# Patient Record
Sex: FEMALE | Race: AMERICAN INDIAN OR ALASKA NATIVE
[De-identification: names, ages, dates, MRNs, and addresses within clinical notes are randomized per-mention and may not be internally consistent; named-entity substitution may affect disease eponyms.]

---

## 2018-03-13 ENCOUNTER — HOSPITAL ENCOUNTER (EMERGENCY)
Dept: HOSPITAL 5 - ED | Age: 28
Discharge: HOME | End: 2018-03-13
Payer: COMMERCIAL

## 2018-03-13 VITALS — DIASTOLIC BLOOD PRESSURE: 58 MMHG | SYSTOLIC BLOOD PRESSURE: 114 MMHG

## 2018-03-13 DIAGNOSIS — O21.9: Primary | ICD-10-CM

## 2018-03-13 DIAGNOSIS — Z3A.01: ICD-10-CM

## 2018-03-13 DIAGNOSIS — F12.10: ICD-10-CM

## 2018-03-13 LAB
ALBUMIN SERPL-MCNC: 4.1 G/DL (ref 3.9–5)
ALT SERPL-CCNC: 13 UNITS/L (ref 7–56)
BASOPHILS # (AUTO): 0 K/MM3 (ref 0–0.1)
BASOPHILS NFR BLD AUTO: 0.4 % (ref 0–1.8)
BILIRUB UR QL STRIP: (no result)
BLOOD UR QL VISUAL: (no result)
BUN SERPL-MCNC: 12 MG/DL (ref 7–17)
BUN/CREAT SERPL: 20 %
CALCIUM SERPL-MCNC: 9.3 MG/DL (ref 8.4–10.2)
EOSINOPHIL # BLD AUTO: 0 K/MM3 (ref 0–0.4)
EOSINOPHIL NFR BLD AUTO: 0.3 % (ref 0–4.3)
HCT VFR BLD CALC: 41.6 % (ref 30.3–42.9)
HEMOLYSIS INDEX: 45
HGB BLD-MCNC: 13.2 GM/DL (ref 10.1–14.3)
LIPASE SERPL-CCNC: 17 UNITS/L (ref 13–60)
LYMPHOCYTES # BLD AUTO: 1.2 K/MM3 (ref 1.2–5.4)
LYMPHOCYTES NFR BLD AUTO: 15.4 % (ref 13.4–35)
MCH RBC QN AUTO: 28 PG (ref 28–32)
MCHC RBC AUTO-ENTMCNC: 32 % (ref 30–34)
MCV RBC AUTO: 89 FL (ref 79–97)
MONOCYTES # (AUTO): 0.3 K/MM3 (ref 0–0.8)
MONOCYTES % (AUTO): 4.4 % (ref 0–7.3)
MUCOUS THREADS #/AREA URNS HPF: (no result) /HPF
PH UR STRIP: 5 [PH] (ref 5–7)
PLATELET # BLD: 259 K/MM3 (ref 140–440)
PROT UR STRIP-MCNC: (no result) MG/DL
RBC # BLD AUTO: 4.67 M/MM3 (ref 3.65–5.03)
RBC #/AREA URNS HPF: 4 /HPF (ref 0–6)
UROBILINOGEN UR-MCNC: 2 MG/DL (ref ?–2)
WBC #/AREA URNS HPF: 3 /HPF (ref 0–6)

## 2018-03-13 PROCEDURE — 84702 CHORIONIC GONADOTROPIN TEST: CPT

## 2018-03-13 PROCEDURE — 76801 OB US < 14 WKS SINGLE FETUS: CPT

## 2018-03-13 PROCEDURE — 80053 COMPREHEN METABOLIC PANEL: CPT

## 2018-03-13 PROCEDURE — 83690 ASSAY OF LIPASE: CPT

## 2018-03-13 PROCEDURE — 36415 COLL VENOUS BLD VENIPUNCTURE: CPT

## 2018-03-13 PROCEDURE — 85025 COMPLETE CBC W/AUTO DIFF WBC: CPT

## 2018-03-13 PROCEDURE — 81001 URINALYSIS AUTO W/SCOPE: CPT

## 2018-03-13 PROCEDURE — 76817 TRANSVAGINAL US OBSTETRIC: CPT

## 2018-03-13 PROCEDURE — 99284 EMERGENCY DEPT VISIT MOD MDM: CPT

## 2018-03-13 PROCEDURE — 96360 HYDRATION IV INFUSION INIT: CPT

## 2018-03-13 NOTE — EMERGENCY DEPARTMENT REPORT
ED Pregnancy HPI





- General


Chief complaint: Abdominal Pain


Stated complaint: ABD PAIN


Time Seen by Provider: 18 12:29


Source: patient


Mode of arrival: Ambulatory


Limitations: No Limitations





- History of Present Illness


Initial comments: 





This is a 27-year-old female  nontoxic, well nourished in appearance, no 

acute signs of distress presents to the ED with c/o of nausea, vomiting, 

abdominal cramping times one week.  Patient describes abdominal cramping 

diffusely.  Patient denies any vaginal bleeding, vaginal discharge, dysuria, 

polyuria or hematuria.  She denies any back pain or flank pain, chest pain, 

short of breath, numbness, tingling, headache or stiff neck.  Patient denies 

any allergies or significant past medical history.  Last menstrual cycle 01/15/

2018.


MD Complaint: abdominal pain, other (n/v)


-: week(s) (1)


Location: abdomen


Radiation: none (diffuse)


Severity: mild


Severity scale (0 -10): 8


Quality: cramping


Consistency: constant


Improves with: none


Worsens with: none


Associated symptoms: nausea/vomiting, abdominal pain.  denies: vaginal bleeding

, vaginal discharge, dysuria, headache, vision changes, malaise, dysparuenia, 

rash, seizure, shortness of breath, syncope, weakness





- Related Data


 Previous Rx's











 Medication  Instructions  Recorded  Last Taken  Type


 


Metoclopramide [Reglan] 10 mg PO TID PRN #20 tab 18 Unknown Rx











 Allergies











Allergy/AdvReac Type Severity Reaction Status Date / Time


 


No Known Allergies Allergy   Unverified 18 12:18














ED Review of Systems


ROS: 


Stated complaint: ABD PAIN


Other details as noted in HPI





Constitutional: denies: chills, fever


Eyes: denies: eye pain, eye discharge, vision change


ENT: denies: ear pain, throat pain


Respiratory: denies: cough, shortness of breath, wheezing


Cardiovascular: denies: chest pain, palpitations


Endocrine: no symptoms reported


Gastrointestinal: abdominal pain, nausea, vomiting.  denies: diarrhea


Genitourinary: denies: urgency, dysuria, discharge


Musculoskeletal: denies: back pain, joint swelling, arthralgia


Skin: denies: rash, lesions


Neurological: denies: headache, weakness, paresthesias


Psychiatric: denies: anxiety, depression


Hematological/Lymphatic: denies: easy bleeding, easy bruising





ED Past Medical Hx





- Past Medical History


Previous Medical History?: No





- Surgical History


Past Surgical History?: No





- Social History


Smoking Status: Never Smoker


Substance Use Type: Alcohol, Marijuana





- Medications


Home Medications: 


 Home Medications











 Medication  Instructions  Recorded  Confirmed  Last Taken  Type


 


Metoclopramide [Reglan] 10 mg PO TID PRN #20 tab 18  Unknown Rx














ED Physical Exam





- General


Limitations: No Limitations


General appearance: alert, in no apparent distress





- Head


Head exam: Present: atraumatic, normocephalic





- Eye


Eye exam: Present: normal appearance, PERRL, EOMI


Pupils: Present: normal accommodation





- ENT


ENT exam: Present: normal exam, normal orophraynx, mucous membranes moist, TM's 

normal bilaterally, normal external ear exam





- Neck


Neck exam: Present: normal inspection, full ROM.  Absent: tenderness, 

meningismus, lymphadenopathy, thyromegaly





- Respiratory


Respiratory exam: Present: normal lung sounds bilaterally.  Absent: respiratory 

distress, wheezes, rales, rhonchi, stridor, chest wall tenderness, accessory 

muscle use, decreased breath sounds, prolonged expiratory





- Cardiovascular


Cardiovascular Exam: Present: regular rate, normal rhythm, normal heart sounds.

  Absent: bradycardia, tachycardia, irregular rhythm, systolic murmur, 

diastolic murmur, rubs, gallop





- GI/Abdominal


GI/Abdominal exam: Present: soft, normal bowel sounds.  Absent: distended, 

tenderness, guarding, rebound, rigid, diminished bowel sounds





- Rectal


Rectal exam: Present: deferred





- Extremities Exam


Extremities exam: Present: normal inspection, full ROM, normal capillary 

refill.  Absent: tenderness, pedal edema, joint swelling, calf tenderness





- Back Exam


Back exam: Present: normal inspection, full ROM.  Absent: tenderness, CVA 

tenderness (R), CVA tenderness (L), muscle spasm, paraspinal tenderness, 

vertebral tenderness, rash noted





- Neurological Exam


Neurological exam: Present: alert, oriented X3, CN II-XII intact, normal gait, 

reflexes normal





- Psychiatric


Psychiatric exam: Present: normal affect, normal mood





- Skin


Skin exam: Present: warm, dry, intact, normal color.  Absent: rash





ED Course





 Vital Signs











  18





  12:16


 


Temperature 97.9 F


 


Pulse Rate 89


 


Respiratory 16





Rate 


 


Blood Pressure 114/58


 


O2 Sat by Pulse 100





Oximetry 














- Reevaluation(s)


Reevaluation #1: 





18 18:19


Patient is speaking in full sentences with no signs of distress noted.





- Consultations


Consultation #1: 





18 18:20


Patient has been consulted with Dr. James about patient history, physical 

exam, and labs and examined and screened patient and agrees to ED plan of care 

and discharge plan of care. 





ED Medical Decision Making





- Lab Data


Result diagrams: 


 18 12:48





 18 12:48





- Medical Decision Making





This is a 27-year-old female that presents with nausea, vomiting, and 

pregnancy.  Patient is stable and was examined by me and Dr. James.  Labs 

within normal limits with slightly dehydration.  Ultrasound obtained and 

dictated by radiologist with small subchorinoic hemorrhage. Heart beat of 123.  

Patient was notified of the ultrasound reported by the patient.  patient 

received 1 l normal saline and zofran.  a by mouth challenge has been obtained 

and patient tolerated well with no nausea vomiting.  Patient was instructed and 

referred to Follow-up with a primary care/obgyn doctor in 3-5 days or if 

symptoms worsen and continue return to emergency room as soon as possible.  At 

time of discharge, the patient does not seem toxic or ill in appearance.  No 

acute signs of distress noted.  Patient agrees to discharge treatment plan of 

care.  No further questions noted by the patient.


Critical care attestation.: 


If time is entered above; I have spent that time in minutes in the direct care 

of this critically ill patient, excluding procedure time.








ED Disposition


Clinical Impression: 


 Nausea & vomiting





Pregnancy


Qualifiers:


 Weeks of gestation: less than 8 weeks Qualified Code(s): Z3A.01 - Less than 8 

weeks gestation of pregnancy





Disposition: DC-01 TO HOME OR SELFCARE


Is pt being admited?: No


Does the pt Need Aspirin: No


Condition: Stable


Instructions:  Pregnancy (ED), Acute Nausea and Vomiting (ED), Metoclopramide (

By mouth)


Additional Instructions: 


Follow-up with a primary care/obgyn doctor in 3-5 days or if symptoms worsen 

and continue return to emergency room as soon as possible.





Prescriptions: 


Metoclopramide [Reglan] 10 mg PO TID PRN #20 tab


 PRN Reason: Nausea


Referrals: 


PRIMARY CARE,MD [Primary Care Provider] - 3-5 Days


LAURA LINDO MD [Staff Physician] - 3-5 Days


MIRZA WADDELL MD [Staff Physician] - 3-5 Days


[facility] [Outside] - 3-5 Days


[facility] [Outside] - 3-5 Days


Forms:  Work/School Release Form(ED)

## 2018-03-13 NOTE — ULTRASOUND REPORT
FINAL REPORT



EXAM:  US OB TRANSVAGINAL



HISTORY:  abdominal pain, pregnant .  LMP 01/25/2018 with

estimated age 6 weeks 5 days and EDC 11/01/2018



TECHNIQUE:  Ultrasound of the pelvis using transabdominal and

transvaginal imaging 



PRIORS:  None.



FINDINGS:  

Uterus: Uterus is enlarged in size and normal and homogeneous in

echogenicity without focal fibroid formation. The uterus measures

7.6 x 4.5 x 5.5 cm in size. There is a single early viable

intrauterine gestation noted. 



Intrauterine gestation: There is a single intrauterine gestation

identified with both a fetal pole and yolk sac. Fetal heart rate

is monitored at 123 BPM using M-mode doppler. Crown-rump

measurement of 0.65 cm corresponds to estimated age 6 weeks 3

days with EDC 11/02/2018. There is a small hypoechoic avascular

subchorionic hemorrhage noted measuring 1.1 x 0.5 x 1.7 cm and

located along the inferior aspect of the gestational sac. 



Ovaries: Both ovaries appear normal in size and echogenicity with

normal blood flow bilaterally. The right ovary measures 3.0 x 1.8

x 2.9 cm and the left ovary measures 3.4 x 2.8 x 3.2 cm in size.

In the left ovary, there is a 2.0 x 1.4 x 1.7 cm mildly complex

cyst, probably a corpus luteum.



Other: There is no evidence for solid adnexal mass is seen. There

is no free fluid in the cul-de-sac. 



IMPRESSION:  

Single intrauterine viable  pregnancy with an approximate age of

6 weeks 3 days. Small subchorionic hemorrhage is present along

the inferior aspect of the gestational sac. Mildly complex cyst

in the left ovary is likely the corpus luteum.

## 2018-03-13 NOTE — ULTRASOUND REPORT
FINAL REPORT



EXAM:  US OB &lt; = 14 WEEKS FETUS



HISTORY:  abdominal pain, pregnant . LMP 01/25/2018 with

estimated age 6 weeks 5 days and EDC 11/01/2018



TECHNIQUE:  Ultrasound of the pelvis using transabdominal and

transvaginal imaging 



PRIORS:  None.



FINDINGS:  

Uterus: Uterus is enlarged in size and normal and homogeneous in

echogenicity without focal fibroid formation. The uterus measures

7.6 x 4.5 x 5.5 cm in size. There is a single early viable

intrauterine gestation noted. 



Intrauterine gestation: There is a single intrauterine gestation

identified with both a fetal pole and yolk sac. Fetal heart rate

is monitored at 123 BPM using M-mode doppler. Crown-rump

measurement of 0.65 cm corresponds to estimated age 6 weeks 3

days with EDC 11/02/2018. There is a small hypoechoic avascular

subchorionic hemorrhage noted measuring 1.1 x 0.5 x 1.7 cm and

located along the inferior aspect of the gestational sac. 



Ovaries: Both ovaries appear normal in size and echogenicity with

normal blood flow bilaterally. The right ovary measures 3.0 x 1.8

x 2.9 cm and the left ovary measures 3.4 x 2.8 x 3.2 cm in size.

In the left ovary, there is a 2.0 x 1.4 x 1.7 cm mildly complex

cyst, probably a corpus luteum.



Other: There is no evidence for solid adnexal mass is seen. There

is no free fluid in the cul-de-sac. 



IMPRESSION:  

Single intrauterine viable  pregnancy with an approximate age of

6 weeks 3 days. Small subchorionic hemorrhage is present along

the inferior aspect of the gestational sac. Mildly complex cyst

in the left ovary is likely the corpus luteum.

## 2018-03-13 NOTE — EMERGENCY DEPARTMENT REPORT
Chief Complaint: Abdominal Pain


Stated Complaint: ABD PAIN


Time Seen by Provider: 03/13/18 12:29





- HPI


History of Present Illness: 





The patient is a 27-year-old female who presents for evaluation of abdominal 

pain.  The patient reports lower cramping abdominal pain and nausea or vomiting 

for the past one week.  She says that she missed her period last month. The 

patient denies fever, chills, night sweats, diarrhea, blood in the stool, dark 

tarry stool, dysuria, hematuria, flank pain, genital discharge, inability to 

pass flatus. 





- Exam


Vital Signs: 


 Vital Signs











  03/13/18





  12:16


 


Temperature 97.9 F


 


Pulse Rate 89


 


Respiratory 16





Rate 


 


Blood Pressure 114/58


 


O2 Sat by Pulse 100





Oximetry 











MSE screening note: 


Focused history and physical exam performed.


Due to findings the following was ordered:











ED Medical Decision Making





- Lab Data


Result diagrams: 


 03/13/18 12:48








ED Disposition for MSE


Condition: Stable


Instructions:  Abdominal Pain (ED)


Referrals: 


PRIMARY CARE,MD [Primary Care Provider] - 3-5 Days

## 2018-03-16 ENCOUNTER — HOSPITAL ENCOUNTER (EMERGENCY)
Dept: HOSPITAL 5 - ED | Age: 28
Discharge: LEFT BEFORE DISCHARGE | End: 2018-03-16
Payer: SELF-PAY

## 2018-03-16 VITALS — DIASTOLIC BLOOD PRESSURE: 83 MMHG | SYSTOLIC BLOOD PRESSURE: 113 MMHG

## 2018-03-16 DIAGNOSIS — Z53.21: ICD-10-CM

## 2018-03-16 DIAGNOSIS — Z32.01: Primary | ICD-10-CM

## 2018-09-17 ENCOUNTER — HOSPITAL ENCOUNTER (OUTPATIENT)
Dept: HOSPITAL 5 - TRG | Age: 28
Discharge: HOME | End: 2018-09-17
Attending: OBSTETRICS & GYNECOLOGY
Payer: COMMERCIAL

## 2018-09-17 VITALS — DIASTOLIC BLOOD PRESSURE: 60 MMHG | SYSTOLIC BLOOD PRESSURE: 108 MMHG

## 2018-09-17 DIAGNOSIS — Z3A.33: ICD-10-CM

## 2018-09-17 DIAGNOSIS — O47.03: Primary | ICD-10-CM

## 2018-09-17 LAB
ALBUMIN SERPL-MCNC: 3.4 G/DL (ref 3.9–5)
ALT SERPL-CCNC: 19 UNITS/L (ref 7–56)
BASOPHILS # (AUTO): 0 K/MM3 (ref 0–0.1)
BASOPHILS NFR BLD AUTO: 0.4 % (ref 0–1.8)
BILIRUB UR QL STRIP: (no result)
BLOOD UR QL VISUAL: (no result)
BUN SERPL-MCNC: 7 MG/DL (ref 7–17)
BUN/CREAT SERPL: 14 %
CALCIUM SERPL-MCNC: 8.8 MG/DL (ref 8.4–10.2)
EOSINOPHIL # BLD AUTO: 0 K/MM3 (ref 0–0.4)
EOSINOPHIL NFR BLD AUTO: 0.6 % (ref 0–4.3)
HCT VFR BLD CALC: 34.7 % (ref 30.3–42.9)
HEMOLYSIS INDEX: 52
HGB BLD-MCNC: 11.3 GM/DL (ref 10.1–14.3)
LYMPHOCYTES # BLD AUTO: 1.1 K/MM3 (ref 1.2–5.4)
LYMPHOCYTES NFR BLD AUTO: 14 % (ref 13.4–35)
MCH RBC QN AUTO: 27 PG (ref 28–32)
MCHC RBC AUTO-ENTMCNC: 33 % (ref 30–34)
MCV RBC AUTO: 84 FL (ref 79–97)
MONOCYTES # (AUTO): 0.4 K/MM3 (ref 0–0.8)
MONOCYTES % (AUTO): 5.1 % (ref 0–7.3)
MUCOUS THREADS #/AREA URNS HPF: (no result) /HPF
PH UR STRIP: 7 [PH] (ref 5–7)
PLATELET # BLD: 195 K/MM3 (ref 140–440)
PROT UR STRIP-MCNC: (no result) MG/DL
RBC # BLD AUTO: 4.15 M/MM3 (ref 3.65–5.03)
RBC #/AREA URNS HPF: < 1 /HPF (ref 0–6)
UROBILINOGEN UR-MCNC: < 2 MG/DL (ref ?–2)
WBC #/AREA URNS HPF: 1 /HPF (ref 0–6)

## 2018-09-17 PROCEDURE — 96360 HYDRATION IV INFUSION INIT: CPT

## 2018-09-17 PROCEDURE — 76815 OB US LIMITED FETUS(S): CPT

## 2018-09-17 PROCEDURE — 85025 COMPLETE CBC W/AUTO DIFF WBC: CPT

## 2018-09-17 PROCEDURE — 59025 FETAL NON-STRESS TEST: CPT

## 2018-09-17 PROCEDURE — 36415 COLL VENOUS BLD VENIPUNCTURE: CPT

## 2018-09-17 PROCEDURE — 81001 URINALYSIS AUTO W/SCOPE: CPT

## 2018-09-17 PROCEDURE — 82962 GLUCOSE BLOOD TEST: CPT

## 2018-09-17 PROCEDURE — 82731 ASSAY OF FETAL FIBRONECTIN: CPT

## 2018-09-17 PROCEDURE — 80053 COMPREHEN METABOLIC PANEL: CPT

## 2018-09-17 PROCEDURE — 76819 FETAL BIOPHYS PROFIL W/O NST: CPT

## 2018-09-18 NOTE — ULTRASOUND REPORT
FINAL REPORT



PROCEDURE:  US OB LIMITED



TECHNIQUE:  Real-time limited sonographic examination was

performed for evaluation of fetal size, position, heartbeat,

fluid volume  for each fetus with image documentation (1 or more

fetuses). CPT 38437







HISTORY:   Labor 



COMPARISON:  No prior studies are available for comparison.



FINDINGS:  

Fetal biophysical profile: 



Fetal breathing movements: 2. 



Fetal movements: 2. 



Fetal posterior and tone: 2. 



Qualitative amniotic fluid volume: 2. 



Total score: 8/8. Fetal heart rate 142 beats per minute. 



Amniotic fluid index is 20.4 centimeters. 



Estimated gestational age is 33 weeks and 3 days. 



IMPRESSION:  

Normal fetal biophysical profile.

## 2021-05-28 NOTE — ULTRASOUND REPORT
Chronic pool   FINAL REPORT



PROCEDURE:  US OB LIMITED



TECHNIQUE:  Real-time limited sonographic examination was

performed for evaluation of fetal size, position, heartbeat,

fluid volume  for each fetus with image documentation (1 or more

fetuses). CPT 68595







HISTORY:   Labor 



COMPARISON:  No prior studies are available for comparison.



FINDINGS:  

Fetal biophysical profile: 



Fetal breathing movements: 2. 



Fetal movements: 2. 



Fetal posterior and tone: 2. 



Qualitative amniotic fluid volume: 2. 



Total score: 8/8. Fetal heart rate 142 beats per minute. 



Amniotic fluid index is 20.4 centimeters. 



Estimated gestational age is 33 weeks and 3 days. 



IMPRESSION:  

Normal fetal biophysical profile.